# Patient Record
Sex: MALE | Race: OTHER | Employment: UNEMPLOYED | URBAN - METROPOLITAN AREA
[De-identification: names, ages, dates, MRNs, and addresses within clinical notes are randomized per-mention and may not be internally consistent; named-entity substitution may affect disease eponyms.]

---

## 2019-06-08 ENCOUNTER — APPOINTMENT (OUTPATIENT)
Dept: CT IMAGING | Age: 79
DRG: 292 | End: 2019-06-08
Payer: MEDICARE

## 2019-06-08 ENCOUNTER — APPOINTMENT (OUTPATIENT)
Dept: GENERAL RADIOLOGY | Age: 79
DRG: 292 | End: 2019-06-08
Payer: MEDICARE

## 2019-06-08 ENCOUNTER — HOSPITAL ENCOUNTER (INPATIENT)
Age: 79
LOS: 4 days | Discharge: HOME | DRG: 292 | End: 2019-06-12
Attending: EMERGENCY MEDICINE | Admitting: INTERNAL MEDICINE
Payer: MEDICARE

## 2019-06-08 DIAGNOSIS — I50.23 ACUTE ON CHRONIC SYSTOLIC HEART FAILURE (HCC): ICD-10-CM

## 2019-06-08 DIAGNOSIS — N50.89 SCROTAL EDEMA: ICD-10-CM

## 2019-06-08 DIAGNOSIS — I50.9 ACUTE CONGESTIVE HEART FAILURE, UNSPECIFIED HEART FAILURE TYPE (HCC): Primary | ICD-10-CM

## 2019-06-08 PROBLEM — I50.43 CHF (CONGESTIVE HEART FAILURE), NYHA CLASS I, ACUTE ON CHRONIC, COMBINED (HCC): Status: ACTIVE | Noted: 2019-06-08

## 2019-06-08 LAB
A/G RATIO: 1.2 (ref 1.1–2.2)
ALBUMIN SERPL-MCNC: 4.1 G/DL (ref 3.4–5)
ALP BLD-CCNC: 98 U/L (ref 40–129)
ALT SERPL-CCNC: 17 U/L (ref 10–40)
ANION GAP SERPL CALCULATED.3IONS-SCNC: 11 MMOL/L (ref 3–16)
APTT: 41.4 SEC (ref 26–36)
AST SERPL-CCNC: 24 U/L (ref 15–37)
BASOPHILS ABSOLUTE: 0 K/UL (ref 0–0.2)
BASOPHILS RELATIVE PERCENT: 0.6 %
BILIRUB SERPL-MCNC: 1.2 MG/DL (ref 0–1)
BILIRUBIN URINE: NEGATIVE
BLOOD, URINE: ABNORMAL
BUN BLDV-MCNC: 15 MG/DL (ref 7–20)
CALCIUM SERPL-MCNC: 9.7 MG/DL (ref 8.3–10.6)
CHLORIDE BLD-SCNC: 104 MMOL/L (ref 99–110)
CLARITY: CLEAR
CO2: 24 MMOL/L (ref 21–32)
COLOR: YELLOW
CREAT SERPL-MCNC: 0.9 MG/DL (ref 0.8–1.3)
EKG ATRIAL RATE: 258 BPM
EKG DIAGNOSIS: NORMAL
EKG Q-T INTERVAL: 482 MS
EKG QRS DURATION: 176 MS
EKG QTC CALCULATION (BAZETT): 516 MS
EKG R AXIS: -77 DEGREES
EKG T AXIS: 105 DEGREES
EKG VENTRICULAR RATE: 69 BPM
EOSINOPHILS ABSOLUTE: 0 K/UL (ref 0–0.6)
EOSINOPHILS RELATIVE PERCENT: 1 %
EPITHELIAL CELLS, UA: 0 /HPF (ref 0–5)
GFR AFRICAN AMERICAN: >60
GFR NON-AFRICAN AMERICAN: >60
GLOBULIN: 3.4 G/DL
GLUCOSE BLD-MCNC: 117 MG/DL (ref 70–99)
GLUCOSE BLD-MCNC: 168 MG/DL (ref 70–99)
GLUCOSE BLD-MCNC: 92 MG/DL (ref 70–99)
GLUCOSE URINE: NEGATIVE MG/DL
HCT VFR BLD CALC: 38.5 % (ref 40.5–52.5)
HEMOGLOBIN: 12.5 G/DL (ref 13.5–17.5)
HYALINE CASTS: 0 /LPF (ref 0–8)
INR BLD: 2.06 (ref 0.86–1.14)
KETONES, URINE: NEGATIVE MG/DL
LEUKOCYTE ESTERASE, URINE: NEGATIVE
LYMPHOCYTES ABSOLUTE: 0.7 K/UL (ref 1–5.1)
LYMPHOCYTES RELATIVE PERCENT: 21.3 %
MCH RBC QN AUTO: 29 PG (ref 26–34)
MCHC RBC AUTO-ENTMCNC: 32.5 G/DL (ref 31–36)
MCV RBC AUTO: 89.2 FL (ref 80–100)
MICROSCOPIC EXAMINATION: YES
MONOCYTES ABSOLUTE: 1.4 K/UL (ref 0–1.3)
MONOCYTES RELATIVE PERCENT: 39 %
NEUTROPHILS ABSOLUTE: 1.3 K/UL (ref 1.7–7.7)
NEUTROPHILS RELATIVE PERCENT: 38.1 %
NITRITE, URINE: NEGATIVE
PDW BLD-RTO: 15.4 % (ref 12.4–15.4)
PERFORMED ON: ABNORMAL
PERFORMED ON: NORMAL
PH UA: 7.5 (ref 5–8)
PLATELET # BLD: 94 K/UL (ref 135–450)
PLATELET SLIDE REVIEW: ABNORMAL
PMV BLD AUTO: 7.9 FL (ref 5–10.5)
POTASSIUM REFLEX MAGNESIUM: 4.5 MMOL/L (ref 3.5–5.1)
PRO-BNP: 3336 PG/ML (ref 0–449)
PRO-BNP: 4372 PG/ML (ref 0–449)
PROTEIN UA: ABNORMAL MG/DL
PROTHROMBIN TIME: 23.5 SEC (ref 9.8–13)
RBC # BLD: 4.32 M/UL (ref 4.2–5.9)
RBC UA: 9 /HPF (ref 0–4)
SODIUM BLD-SCNC: 139 MMOL/L (ref 136–145)
SPECIFIC GRAVITY UA: 1.01 (ref 1–1.03)
TOTAL PROTEIN: 7.5 G/DL (ref 6.4–8.2)
TROPONIN: <0.01 NG/ML
URINE REFLEX TO CULTURE: ABNORMAL
URINE TYPE: ABNORMAL
UROBILINOGEN, URINE: 1 E.U./DL
WBC # BLD: 3.5 K/UL (ref 4–11)
WBC UA: 0 /HPF (ref 0–5)

## 2019-06-08 PROCEDURE — 6360000002 HC RX W HCPCS: Performed by: INTERNAL MEDICINE

## 2019-06-08 PROCEDURE — 6360000002 HC RX W HCPCS: Performed by: EMERGENCY MEDICINE

## 2019-06-08 PROCEDURE — 85025 COMPLETE CBC W/AUTO DIFF WBC: CPT

## 2019-06-08 PROCEDURE — 36415 COLL VENOUS BLD VENIPUNCTURE: CPT

## 2019-06-08 PROCEDURE — 2580000003 HC RX 258: Performed by: INTERNAL MEDICINE

## 2019-06-08 PROCEDURE — 83880 ASSAY OF NATRIURETIC PEPTIDE: CPT

## 2019-06-08 PROCEDURE — 6370000000 HC RX 637 (ALT 250 FOR IP): Performed by: INTERNAL MEDICINE

## 2019-06-08 PROCEDURE — 93005 ELECTROCARDIOGRAM TRACING: CPT | Performed by: EMERGENCY MEDICINE

## 2019-06-08 PROCEDURE — 2060000000 HC ICU INTERMEDIATE R&B

## 2019-06-08 PROCEDURE — 80053 COMPREHEN METABOLIC PANEL: CPT

## 2019-06-08 PROCEDURE — 81001 URINALYSIS AUTO W/SCOPE: CPT

## 2019-06-08 PROCEDURE — 71045 X-RAY EXAM CHEST 1 VIEW: CPT

## 2019-06-08 PROCEDURE — 85610 PROTHROMBIN TIME: CPT

## 2019-06-08 PROCEDURE — 93010 ELECTROCARDIOGRAM REPORT: CPT | Performed by: INTERNAL MEDICINE

## 2019-06-08 PROCEDURE — 96374 THER/PROPH/DIAG INJ IV PUSH: CPT

## 2019-06-08 PROCEDURE — 99285 EMERGENCY DEPT VISIT HI MDM: CPT

## 2019-06-08 PROCEDURE — 74176 CT ABD & PELVIS W/O CONTRAST: CPT

## 2019-06-08 PROCEDURE — 83036 HEMOGLOBIN GLYCOSYLATED A1C: CPT

## 2019-06-08 PROCEDURE — 84484 ASSAY OF TROPONIN QUANT: CPT

## 2019-06-08 PROCEDURE — 85730 THROMBOPLASTIN TIME PARTIAL: CPT

## 2019-06-08 RX ORDER — FUROSEMIDE 10 MG/ML
20 INJECTION INTRAMUSCULAR; INTRAVENOUS 4 TIMES DAILY
Status: DISCONTINUED | OUTPATIENT
Start: 2019-06-08 | End: 2019-06-10

## 2019-06-08 RX ORDER — SODIUM CHLORIDE 0.9 % (FLUSH) 0.9 %
10 SYRINGE (ML) INJECTION EVERY 12 HOURS SCHEDULED
Status: DISCONTINUED | OUTPATIENT
Start: 2019-06-08 | End: 2019-06-12 | Stop reason: HOSPADM

## 2019-06-08 RX ORDER — LISINOPRIL 30 MG/1
30 TABLET ORAL DAILY
Status: ON HOLD | COMMUNITY
End: 2019-06-12 | Stop reason: HOSPADM

## 2019-06-08 RX ORDER — ONDANSETRON 2 MG/ML
4 INJECTION INTRAMUSCULAR; INTRAVENOUS EVERY 6 HOURS PRN
Status: DISCONTINUED | OUTPATIENT
Start: 2019-06-08 | End: 2019-06-12 | Stop reason: HOSPADM

## 2019-06-08 RX ORDER — DEXTROSE MONOHYDRATE 25 G/50ML
12.5 INJECTION, SOLUTION INTRAVENOUS PRN
Status: DISCONTINUED | OUTPATIENT
Start: 2019-06-08 | End: 2019-06-12 | Stop reason: HOSPADM

## 2019-06-08 RX ORDER — DEXTROSE MONOHYDRATE 50 MG/ML
100 INJECTION, SOLUTION INTRAVENOUS PRN
Status: DISCONTINUED | OUTPATIENT
Start: 2019-06-08 | End: 2019-06-12 | Stop reason: HOSPADM

## 2019-06-08 RX ORDER — SIMVASTATIN 40 MG
40 TABLET ORAL NIGHTLY
COMMUNITY

## 2019-06-08 RX ORDER — NICOTINE POLACRILEX 4 MG
15 LOZENGE BUCCAL PRN
Status: DISCONTINUED | OUTPATIENT
Start: 2019-06-08 | End: 2019-06-12 | Stop reason: HOSPADM

## 2019-06-08 RX ORDER — SODIUM CHLORIDE 0.9 % (FLUSH) 0.9 %
10 SYRINGE (ML) INJECTION PRN
Status: DISCONTINUED | OUTPATIENT
Start: 2019-06-08 | End: 2019-06-12 | Stop reason: HOSPADM

## 2019-06-08 RX ORDER — SIMVASTATIN 10 MG
20 TABLET ORAL NIGHTLY
Status: DISCONTINUED | OUTPATIENT
Start: 2019-06-08 | End: 2019-06-12 | Stop reason: HOSPADM

## 2019-06-08 RX ORDER — GLIMEPIRIDE 2 MG/1
2 TABLET ORAL
COMMUNITY

## 2019-06-08 RX ORDER — FUROSEMIDE 10 MG/ML
40 INJECTION INTRAMUSCULAR; INTRAVENOUS ONCE
Status: COMPLETED | OUTPATIENT
Start: 2019-06-08 | End: 2019-06-08

## 2019-06-08 RX ADMIN — FUROSEMIDE 40 MG: 10 INJECTION, SOLUTION INTRAMUSCULAR; INTRAVENOUS at 14:20

## 2019-06-08 RX ADMIN — RIVAROXABAN 20 MG: 20 TABLET, FILM COATED ORAL at 18:34

## 2019-06-08 RX ADMIN — FUROSEMIDE 20 MG: 10 INJECTION, SOLUTION INTRAMUSCULAR; INTRAVENOUS at 20:38

## 2019-06-08 RX ADMIN — SIMVASTATIN 20 MG: 10 TABLET, FILM COATED ORAL at 20:38

## 2019-06-08 RX ADMIN — Medication 10 ML: at 20:38

## 2019-06-08 RX ADMIN — METOPROLOL TARTRATE 25 MG: 25 TABLET ORAL at 20:38

## 2019-06-08 SDOH — HEALTH STABILITY: MENTAL HEALTH: HOW OFTEN DO YOU HAVE A DRINK CONTAINING ALCOHOL?: NEVER

## 2019-06-08 ASSESSMENT — PAIN SCALES - GENERAL
PAINLEVEL_OUTOF10: 0
PAINLEVEL_OUTOF10: 0

## 2019-06-08 NOTE — H&P
Hospital Medicine History & Physical      PCP: ALLY Quinteros MD    Date of Admission: 6/8/2019    Date of Service: Pt seen/examined on 6.8.19 . Patient will be admitted  in/to the hospital.    Chief Complaint:  Sob, scrotzl swelling x 1 week       History Of Present Illness:      [de-identified] y.o. male who presented to [facility] ed  with above complaints. Symptom onset has been acute for a time period of 1 week(s). Severity is described as mild-moderate. Course of his symptoms over time is constant. Associated with swellign and wt gain   Not Associated with fever, chils   No h/o of recent travel, ill contacts,weight loss. Pain description -  No pain     Past Medical History:          Diagnosis Date    AV heart block     CAD (coronary artery disease)     had blockages that needed bypass    [facility]     Diabetes mellitus ([facility] [address]     Hyperlipidemia     Hypertension        Past Surgical History:          Procedure Laterality Date    CORONARY ARTERY BYPASS GRAFT      EYE SURGERY      PACEMAKER PLACEMENT         Medications Prior to Admission:      Prior to Admission medications    Medication Sig Start Date End Date Taking?  Authorizing Provider   metoprolol succinate (TOPROL XL) 50 MG extended release tablet Take 1 tablet by mouth daily 6/13/19  Yes Rashid Wharton MD   lisinopril (PRINIVIL;ZESTRIL) 5 MG tablet Take 1 tablet by mouth daily 6/13/19  Yes Sofya Green MD   furosemide (LASIX) 40 MG tablet Take 1 tablet by mouth daily 6/13/19  Yes Sofya Green MD   aspirin 81 MG chewable tablet Take 1 tablet by mouth daily 6/13/19  Yes Rashid Wharton MD   vitamin C (ASCORBIC ACID) 500 MG tablet Take 500 mg by mouth daily   Yes Historical Provider, MD   Multiple Vitamins-Minerals (THERAPEUTIC MULTIVITAMIN-MINERALS) tablet Take 1 tablet by mouth daily   Yes Historical Provider, MD   rivaroxaban (XARELTO) 20 MG TABS tablet Take 20 mg by mouth Daily with supper   Yes Historical Provider, MD   glimepiride (AMARYL) 2 MG tablet Take 2 mg by mouth every morning (before breakfast)   Yes Historical Provider, MD   simvastatin (ZOCOR) 40 MG tablet Take 40 mg by mouth nightly   Yes Historical Provider, MD       Allergies:  Patient has no known allergies. Social History:      The patient currently lives at home with son       TOBACCO:   reports that he has never smoked. He has never used smokeless tobacco.  ETOH:   reports that he does not drink alcohol. Family History:      Reviewed in detail and d/w patient. Family History   Problem Relation Age of Onset    No Known Problems Mother     No Known Problems Father             PHYSICAL EXAM PERFORMED BY ME:    /71   Pulse 68   Temp 97.9 °F (36.6 °C) (Oral)   Resp 18   Ht 5' 4\" (1.626 m)   Wt 159 lb 6.3 oz (72.3 kg)   SpO2 93%   BMI 27.36 kg/m²     General appearance: comfortable, distress- none   HEENT: Atraumatic, Pupils equal, round, and reactive to light. Extra ocular muscles intact. Neck: Supple, with full range of motion. No jugular venous distention. Respiratory:  Crackles on  auscultation , accessory muscle use no, Rales/Ronchi n  Cardiovascular: Regular rate and rhythm with normal S1/S2 ,  Abdomen: Soft, non-tender, non-distended with normal bowel sounds. Musculoskelatal: No clubbing, 1+  edema bilaterally. Dorsalis pedal pulses +   And capillary refills time is  <  than 3 secs. Skin: Skin color, texture, turgor normal.     Neurologic:   Neurovascularly intact grossly non-focal.      CXR:  I have reviewed the CXR with the following interpretation: pul edema   EKG:  I have reviewed the EKG with the following interpretation: vpaced     Labs:     No results for input(s): WBC, HGB, HCT, PLT in the last 72 hours. No results for input(s): NA, K, CL, CO2, BUN, CREATININE, CALCIUM, PHOS in the last 72 hours.     Invalid input(s): MAGNES  No results for input(s): AST, ALT, BILIDIR, BILITOT, ALKPHOS in the last 72 hours. No results for input(s): INR in the last 72 hours. No results for input(s): Sonal Lacrosse in the last 72 hours. No flowsheet data found. Urinalysis:      Lab Results   Component Value Date    NITRU Negative 06/08/2019    WBCUA 0 06/08/2019    RBCUA 9 06/08/2019    BLOODU MODERATE 06/08/2019    SPECGRAV 1.013 06/08/2019    GLUCOSEU Negative 06/08/2019       Reviewed his/her  old charts from [address] [place] [address], dated 2019 , showed outpatient visit with Dr. Skip Bacon for reg visit       Diagnostic Assesment and Plan :   1. Admitted for acute on chronic diastolic CHF, swelling in legs and scrotum, requiring IV Lasix. History of chronic atrial fibrillation on anticoagulation with Xarelto, status post permanent pacemaker, EKG is V paced. BNP is more than 3000 no evidence of cellulitis on examination  2. Diabetes mellitus type 2 on oral hypoglycemic agents, now on hold, monitors glucoses on sliding scale. 3.  Obesity he needed to lose some weight. 4.  CAD, remote history of CABG, no active chest pain at this time, will monitor troponins  Body mass index is 27.36 kg/m². The following items were considered in medical decision making:  Independent review of images, Review / order clinical lab tests, Review / order radiology, tests Decision to obtain old records. DVT Prophylaxis: xarelto   Diet: No diet orders on file  Code Status: Prior    PT/OT Eval Status: na    : na     Dispo - will monitor in floor   Needs to stay in hospital for iv lasix . I have discussed the above stated plan with the patient,  and they verbalized understanding and agreed with the plan. A total time of 15 min were exclusively devoted to discuss plan of care, and advanced care planning during this encounter. RN notified about todays plan  bed side. Sunny Dawson, [address]  This chart was generated in part by using Dragon Dictation system and may contain errors related to that system including errors in grammar, punctuation, and spelling, as well as words and phrases that may be inappropriate. When dictating, effort is made to correct spelling/grammar errors. If there are any questions or concerns please feel free to contact the dictating provider for clarification. Thank you ALLY Phelan MD for the opportunity to be involved in this patient's care. If you have any questions or concerns please feel free to contact me at [phone].

## 2019-06-08 NOTE — ED PROVIDER NOTES
[facility]  eMERGENCY dEPARTMENT eNCOUnter      Pt Name: Bon Talamantes  MRN: [id number]  Alexisgfyuan 1940  Date of evaluation: 6/8/2019  Provider: Darlyn Abreu, [address] [place] [address]       Chief Complaint   Patient presents with    Other     swollen scrotum with tenderness         HISTORY OF PRESENT ILLNESS   (Location/Symptom, Timing/Onset, Context/Setting, Quality, Duration, Modifying Factors, Severity)  Note limiting factors. Bon Talamantes is a [de-identified] y.o. male who presents to the emergency department with a complaint of scrotal edema that has been present for the last week. He denies any fever or chills. No prior occurrence. No history of hernia. He denies any trauma or injury. He reports that urine output has been slightly decreased from baseline but he is able to urinate without difficulty. Last bowel movement was earlier today and was normal. He denies any scrotal pain but states that it is a little bit tender from being so swollen. He does have lower extremity edema as well which seems to be slightly increased from baseline. He denies any prior history of congestive heart failure. He denies any use of diuretics. He is anticoagulated on Xarelto \"for his heart\" but denies any prior history of atrial fibrillation. He denies any prior history of kidney or liver disease. He denies any dysuria hematuria frequency or urgency. He denies any vomiting or diarrhea. He denies any abdominal pain back pain or flank pain. HPI    Nursing Notes were reviewed. REVIEW OF SYSTEMS    (2-9 systems for level 4, 10 or more for level 5)       Constitutional: Negative for fever or chills. HENT: Negative for rhinorrhea and sore throat. Eyes: Negative for redness or drainage. Respiratory: Negative for shortness of breath or dyspnea on exertion. Cardiovascular: Negative for chest pain. Gastrointestinal: Negative for abdominal pain. Negative for vomiting or diarrhea. Genitourinary: Negative for flank pain. Negative for dysuria. Negative for hematuria. Neurological: Negative for headache. Hematological: Negative for adenopathy. Psychiatric/Behavioral: Negative for confusion. All systems are reviewed and are negative except for those listed above in the history of present illness and ROS. PAST MEDICAL HISTORY     Past Medical History:   Diagnosis Date    CAD (coronary artery disease)     had blockages that needed bypass    Diabetes mellitus ([facility] [address]     Hypertension          SURGICAL HISTORY       Past Surgical History:   Procedure Laterality Date    CARDIAC SURGERY      pt had bypass 15 years ago         CURRENT MEDICATIONS       Previous Medications    No medications on file       ALLERGIES     Patient has no known allergies. FAMILY HISTORY     History reviewed. No pertinent family history. SOCIAL HISTORY       Social History     Socioeconomic History    Marital status:       Spouse name: None    Number of children: None    Years of education: None    Highest education level: None   Occupational History    None   Social Needs    Financial resource strain: None    Food insecurity:     Worry: None     Inability: None    Transportation needs:     Medical: None     Non-medical: None   Tobacco Use    Smoking status: Never Smoker    Smokeless tobacco: Never Used   Substance and Sexual Activity    Alcohol use: Never     Frequency: Never    Drug use: Never    Sexual activity: None   Lifestyle    Physical activity:     Days per week: None     Minutes per session: None    Stress: None   Relationships    Social connections:     Talks on phone: None     Gets together: None     Attends Spiritism service: None     Active member of club or organization: None     Attends meetings of clubs or organizations: None     Relationship status: None    Intimate partner violence:     Fear of current or ex partner: None     Emotionally abused: None Physically abused: None     Forced sexual activity: None   Other Topics Concern    None   Social History Narrative    None       SCREENINGS             PHYSICAL EXAM    (up to 7 for level 4, 8 or more for level 5)     ED Triage Vitals [06/08/19 1249]   BP Temp Temp Source Pulse Resp SpO2 Height Weight   134/72 97.6 °F (36.4 °C) Oral 69 14 98 % 5' 4\" (1.626 m) 179 lb 3.7 oz (81.3 kg)       Physical Exam   Constitutional: Awake and alert and oriented to person place and time. No apparent distress. Head: No visible evidence of trauma. Normocephalic. Eyes: Pupils equal and reactive. No photophobia. Conjunctiva normal.    HENT: Oral mucosa moist.  Airway patent. Neck:  Soft and supple. Heart:  Regular rate and rhythm. No murmur. Lungs:  Slight conversational dyspnea. No accessory muscle use. By basilar rales were noted. Chest: Chest wall non-tender. No evidence of trauma. Abdomen:  Soft, nondistended, bowel sounds present. Nontender. No guarding rigidity or rebound. No masses. There is edema to the lower abdominal wall. No definite hernia palpated. The penis was retracted into the scrotal edema. I was able to visualize the head of the penis and it appeared normal. Urethral meatus was normal. No drainage. No blood. Scrotal skin appeared to be stretched tightly due to the large amount of scrotal edema, however, there is no erythema. No visible necrosis. No crepitance. In the scrotum was grossly nontender. Testicles were nontender to palpation. Musculoskeletal: Extremities non-tender with full range of motion. There was 2+ bilateral lower extremity edema and capillary refill less than 2 seconds in all digits. No edema in the upper extremities. Neurological: Alert and oriented x 3. Speech clear. Cranial nerves II-XII intact. No facial droop. No acute focal motor or sensory deficits. Skin: Skin is warm and dry. No rash. Lymphatic:  No lympadenopathy. Psychiatric: Normal mood and affect.  Behavior nontender, there is only some mild vague discomfort due to the stretching of the skin. No clinical suspicion for testicular torsion or orchitis. He does have significant bilateral lower extremity edema as well as edema extending onto the lower abdominal wall consistent with anasarca. CT abdomen and pelvis without contrast was ordered to assess the scrotum, evidence of ascites, and abdominal wall edema, and rule out hernia. MDM      REASSESSMENT          Laboratory studies were reviewed. White blood cell count is normal. I suspect that the scrotal edema is secondary to volume overload. He does have evidence of acute CHF with some bilateral pleural effusions and pulmonary edema. He was given Lasix 40 mg IV. He will be admitted for further treatment and evaluation. Renal function was normal. Liver function was normal. Albumin was normal.  BNP was 3336. I suspect that his scrotal edema and anasarca will improve with diuresis. A call was placed to the hospitalist on-call for admission. CRITICAL CARE TIME   Total Critical Care time was 0 minutes, excluding separately reportable procedures. There was a high probability of clinically significant/life threatening deterioration in the patient's condition which required my urgent intervention. CONSULTS:  None    PROCEDURES:  Unless otherwise noted below, none     Procedures        FINAL IMPRESSION      1. Acute congestive heart failure, unspecified heart failure type ([address]    2. Scrotal edema          DISPOSITION/PLAN   DISPOSITION        PATIENT REFERRED TO:  No follow-up provider specified. DISCHARGE MEDICATIONS:  New Prescriptions    No medications on file     Controlled Substances Monitoring:     No flowsheet data found. (Please note that portions of this note were completed with a voice recognition program.  Efforts were made to edit the dictations but occasionally words are mis-transcribed. )    1859 Todd Garcia DO (electronically signed)  Attending Emergency Physician          Grazyna Gregorio DO  06/08/19 5600

## 2019-06-09 LAB
ANION GAP SERPL CALCULATED.3IONS-SCNC: 13 MMOL/L (ref 3–16)
BUN BLDV-MCNC: 17 MG/DL (ref 7–20)
CALCIUM SERPL-MCNC: 10 MG/DL (ref 8.3–10.6)
CHLORIDE BLD-SCNC: 101 MMOL/L (ref 99–110)
CHOLESTEROL, TOTAL: 94 MG/DL (ref 0–199)
CO2: 26 MMOL/L (ref 21–32)
CREAT SERPL-MCNC: 1 MG/DL (ref 0.8–1.3)
ESTIMATED AVERAGE GLUCOSE: 180 MG/DL
GFR AFRICAN AMERICAN: >60
GFR NON-AFRICAN AMERICAN: >60
GLUCOSE BLD-MCNC: 126 MG/DL (ref 70–99)
GLUCOSE BLD-MCNC: 152 MG/DL (ref 70–99)
GLUCOSE BLD-MCNC: 152 MG/DL (ref 70–99)
GLUCOSE BLD-MCNC: 71 MG/DL (ref 70–99)
GLUCOSE BLD-MCNC: 79 MG/DL (ref 70–99)
HBA1C MFR BLD: 7.9 %
HDLC SERPL-MCNC: 57 MG/DL (ref 40–60)
LDL CHOLESTEROL CALCULATED: 27 MG/DL
MAGNESIUM: 1.9 MG/DL (ref 1.8–2.4)
PERFORMED ON: ABNORMAL
PERFORMED ON: NORMAL
POTASSIUM SERPL-SCNC: 3.8 MMOL/L (ref 3.5–5.1)
SODIUM BLD-SCNC: 140 MMOL/L (ref 136–145)
TRIGL SERPL-MCNC: 49 MG/DL (ref 0–150)
TROPONIN: <0.01 NG/ML
VLDLC SERPL CALC-MCNC: 10 MG/DL

## 2019-06-09 PROCEDURE — 6370000000 HC RX 637 (ALT 250 FOR IP): Performed by: INTERNAL MEDICINE

## 2019-06-09 PROCEDURE — 83735 ASSAY OF MAGNESIUM: CPT

## 2019-06-09 PROCEDURE — 2580000003 HC RX 258: Performed by: INTERNAL MEDICINE

## 2019-06-09 PROCEDURE — 94760 N-INVAS EAR/PLS OXIMETRY 1: CPT

## 2019-06-09 PROCEDURE — 6360000002 HC RX W HCPCS: Performed by: INTERNAL MEDICINE

## 2019-06-09 PROCEDURE — 80048 BASIC METABOLIC PNL TOTAL CA: CPT

## 2019-06-09 PROCEDURE — 2060000000 HC ICU INTERMEDIATE R&B

## 2019-06-09 PROCEDURE — 80061 LIPID PANEL: CPT

## 2019-06-09 PROCEDURE — 36415 COLL VENOUS BLD VENIPUNCTURE: CPT

## 2019-06-09 PROCEDURE — 84484 ASSAY OF TROPONIN QUANT: CPT

## 2019-06-09 RX ORDER — M-VIT,TX,IRON,MINS/CALC/FOLIC 27MG-0.4MG
1 TABLET ORAL DAILY
COMMUNITY

## 2019-06-09 RX ORDER — ASCORBIC ACID 500 MG
500 TABLET ORAL DAILY
COMMUNITY

## 2019-06-09 RX ADMIN — FUROSEMIDE 20 MG: 10 INJECTION, SOLUTION INTRAMUSCULAR; INTRAVENOUS at 18:55

## 2019-06-09 RX ADMIN — FUROSEMIDE 20 MG: 10 INJECTION, SOLUTION INTRAMUSCULAR; INTRAVENOUS at 20:29

## 2019-06-09 RX ADMIN — Medication 10 ML: at 20:30

## 2019-06-09 RX ADMIN — INSULIN LISPRO 1 UNITS: 100 INJECTION, SOLUTION INTRAVENOUS; SUBCUTANEOUS at 21:55

## 2019-06-09 RX ADMIN — SIMVASTATIN 20 MG: 10 TABLET, FILM COATED ORAL at 20:29

## 2019-06-09 RX ADMIN — FUROSEMIDE 20 MG: 10 INJECTION, SOLUTION INTRAMUSCULAR; INTRAVENOUS at 09:04

## 2019-06-09 RX ADMIN — FUROSEMIDE 20 MG: 10 INJECTION, SOLUTION INTRAMUSCULAR; INTRAVENOUS at 12:28

## 2019-06-09 RX ADMIN — RIVAROXABAN 20 MG: 20 TABLET, FILM COATED ORAL at 18:56

## 2019-06-09 RX ADMIN — Medication 10 ML: at 09:04

## 2019-06-09 RX ADMIN — METOPROLOL TARTRATE 25 MG: 25 TABLET ORAL at 20:29

## 2019-06-09 RX ADMIN — METOPROLOL TARTRATE 25 MG: 25 TABLET ORAL at 09:04

## 2019-06-09 ASSESSMENT — PAIN SCALES - GENERAL: PAINLEVEL_OUTOF10: 0

## 2019-06-09 NOTE — PROGRESS NOTES
Adrienne Crowell MD  6/9/2019,   ALLY Baum MD    1940  Chief Complaint   Patient presents with    Other     swollen scrotum with tenderness        Active Problems:    CHF (congestive heart failure), NYHA class I, acute on chronic, combined ([address]  Resolved Problems:    * No resolved hospital problems. *     has a past medical history of CAD (coronary artery disease), Diabetes mellitus ([address] Hyperlipidemia, and Hypertension. Past Surgical History:     has a past surgical history that includes Cardiac surgery and eye surgery. ED REVIEW:  Chief Complaint   Patient presents with    Other       swollen scrotum with tenderness            HISTORY OF PRESENT ILLNESS   (Location/Symptom, Timing/Onset, Context/Setting, Quality, Duration, Modifying Factors, Severity)  Note limiting factors. Clay Bustillo is a [de-identified] y.o. male who presents to the emergency department with a complaint of scrotal edema that has been present for the last week. He denies any fever or chills. No prior occurrence. No history of hernia. He denies any trauma or injury. He reports that urine output has been slightly decreased from baseline but he is able to urinate without difficulty. Last bowel movement was earlier today and was normal. He denies any scrotal pain but states that it is a little bit tender from being so swollen. He does have lower extremity edema as well which seems to be slightly increased from baseline. CURRENT STATUS:Diagnostic Assesment and Plan :   1. Admitted for acute on chronic diastolic CHF, swelling in legs and scrotum, requiring IV Lasix. History of chronic atrial fibrillation on anticoagulation with Xarelto, status post permanent pacemaker, EKG is V paced. BNP is more than 3000 no evidence of cellulitis on examination  2. Diabetes mellitus type 2 on oral hypoglycemic agents, now on hold, monitors glucoses on sliding scale. 3.  Obesity he needed to lose some weight.   4.  CAD, remote history of CABG, no active chest pain at this time, will monitor troponins  Body mass index is 30.77 kg/m².        The following items were considered in medical decision making:  Independent review of images, Review / order clinical lab tests, Review   DR Best      IMAGING-Relevant:  Anasarca noted in the lower extremities and lower abdominal wall.  Severe  scrotal wall edema and likely bilateral hydroceles.  No soft tissue gas to  suggest gangrene. Peritoneum/Retroperitoneum: No evidence of AAA or lymphadenopathy.  Trace  ascites.  Small fat containing inguinal hernias bilaterally. Bones/Soft Tissues: No suspicious osseous lesions.      Impression:       Severe scrotal soft tissue swelling suspicious for cellulitis.  No soft  tissue gas to suggest gangrene. Cardiomegaly and bilateral pleural effusions, possibly representing mild  pulmonary edema. Cholelithiasis. MY REVIEW:  [de-identified] male  scrotal issue ? Cellulitis or  anasarca? Hematuria  Arrythmia Atrial fibrillation  CHF pulmonary edema  Hyperglycemia 168  152  Hyperbilirubinemia 1.2  Anticoagulation inr 2.06  Hyperlipidemia on RX  Cholelithiasis      Workup continues  The Utilization Review Committee members, including its physician members, have reviewed this case and agree that this patient does meet evidence based criteria for inpatient services,  to ADMISSION =\"admit as inpatient\"  Medical care will continue to be provided by Chele Murillo MD, who concurs with this decision and has documented his/her concurrence in the patient's medical record. Pritesh Garcia MD, MISSY, [place] [address], [address] [place] [address]  Cardiac, Vascular & Thoracic Surgeon  [address] [place] [id number]    Office [phone]   [phone]  [email or web address]    6/9/2019  3:05 PM

## 2019-06-09 NOTE — PROGRESS NOTES
Pt actually in bed with his eyes closed. Has slept very little. Was not able to get comfortable. Had stated earlier that he takes little naps.

## 2019-06-09 NOTE — PROGRESS NOTES
Progress Note    Admit Date: 6/8/2019         Subjective and Overnight Events: Pt being followed up for scrotal edema and SOB  SOB resolved   No sob  No chest pain   Scrotal edema better  No pain  No erythema no calor       Objective:   Vitals: /84   Pulse 81   Temp 97.3 °F (36.3 °C) (Oral)   Resp 18   Ht 5' 4\" (1.626 m)   Wt 172 lb 6.4 oz (78.2 kg)   SpO2 98%   BMI 29.59 kg/m²   /84   Pulse 81   Temp 97.3 °F (36.3 °C) (Oral)   Resp 18   Ht 5' 4\" (1.626 m)   Wt 172 lb 6.4 oz (78.2 kg)   SpO2 98%   BMI 29.59 kg/m²     General Appearance:    Alert, cooperative, no distress, appears stated age   Head:    Normocephalic, without obvious abnormality, atraumatic   Eyes:    PERRL, conjunctiva/corneas clear       Ears:    Normal TM's and external ear canals, both ears   Nose:   Nares normal, septum midline, mucosa normal   Throat:   Lips, mucosa, and tongue normal; teeth and gums normal           Lungs:     Clear to auscultation bilaterally, respirations unlabored       Heart:    Regular rate and rhythm, S1 and S2 normal, no murmur, rub   or gallop   Abdomen:     Soft, non-tender, bowel sounds active all four quadrants,     no masses, no organomegaly           Extremities: Scrotal edema better  No pain  No erythema no calor    Pulses:    Skin:        Neurologic:   CNII-XII intact.  Normal strength, sensation and reflexes       throughout         Pain is:None  Nausea:None  Bowel Movement/Flatus yes    Data:     Scheduled Medications:    sodium chloride flush  10 mL Intravenous 2 times per day    furosemide  20 mg Intravenous 4x Daily    rivaroxaban  20 mg Oral Daily    insulin lispro  0-6 Units Subcutaneous TID WC    insulin lispro  0-3 Units Subcutaneous Nightly    simvastatin  20 mg Oral Nightly    metoprolol tartrate  25 mg Oral BID      PRN Medications: sodium chloride flush, magnesium hydroxide, ondansetron, glucose, dextrose, glucagon

## 2019-06-09 NOTE — PLAN OF CARE
Problem: SKIN INTEGRITY  Goal: Skin integrity is maintained or improved  Outcome: Ongoing     Problem: OXYGENATION/RESPIRATORY FUNCTION  Goal: Patient will maintain patent airway  Outcome: Ongoing     Problem: OXYGENATION/RESPIRATORY FUNCTION  Goal: Patient will achieve/maintain normal respiratory rate/effort  Description  Respiratory rate and effort will be within normal limits for the patient  Outcome: Ongoing     Problem: HEMODYNAMIC STATUS  Goal: Patient has stable vital signs and fluid balance  Outcome: Ongoing     Problem: FLUID AND ELECTROLYTE IMBALANCE  Goal: Fluid and electrolyte balance are achieved/maintained  Outcome: Completed   Assess breath sounds, oxygen requirements and saturations, and activity tolerance. Monitor intake and output and daily weights and lab values. Problem: SAFETY  Goal: Free from intentional harm  Outcome: Ongoing   Patient assessed for fall risk; fall precautions initiated. Patient and family instructed about safety devices. Environment kept free of clutter and adequate lighting provided. Bed locked and in lowest position. Call light within reach.

## 2019-06-09 NOTE — PLAN OF CARE
Fall risk precautions in place. Bed in lowest position with wheels locked,bed alarm in place and activated,non-skid socks on pt, fall risk ID on pt, call light in reach, will continue to monitor. Skin assessment completed every shift. Pt assessed for incontinence, appropriate barrier cream applied prn. Pt encouraged to turn/rotate every 2 hours. Assistance provided if pt unable to do so themselves. Educated patient/family on CHF signs/ symptoms, causes, treatments, limited fluid intake, daily weights, discharge medications, low sodium diet, activiety and follow-up. Pt to call physician if weight gain of 3 pounds in one day or 5 pounds in one week.

## 2019-06-10 LAB
ANION GAP SERPL CALCULATED.3IONS-SCNC: 14 MMOL/L (ref 3–16)
BUN BLDV-MCNC: 26 MG/DL (ref 7–20)
CALCIUM SERPL-MCNC: 10.9 MG/DL (ref 8.3–10.6)
CHLORIDE BLD-SCNC: 97 MMOL/L (ref 99–110)
CO2: 29 MMOL/L (ref 21–32)
CREAT SERPL-MCNC: 0.8 MG/DL (ref 0.8–1.3)
GFR AFRICAN AMERICAN: >60
GFR NON-AFRICAN AMERICAN: >60
GLUCOSE BLD-MCNC: 123 MG/DL (ref 70–99)
GLUCOSE BLD-MCNC: 131 MG/DL (ref 70–99)
GLUCOSE BLD-MCNC: 158 MG/DL (ref 70–99)
GLUCOSE BLD-MCNC: 87 MG/DL (ref 70–99)
LV EF: 28 %
LVEF MODALITY: NORMAL
MAGNESIUM: 2 MG/DL (ref 1.8–2.4)
PERFORMED ON: ABNORMAL
PERFORMED ON: ABNORMAL
PERFORMED ON: NORMAL
POTASSIUM SERPL-SCNC: 4.4 MMOL/L (ref 3.5–5.1)
PRO-BNP: 3175 PG/ML (ref 0–449)
SODIUM BLD-SCNC: 140 MMOL/L (ref 136–145)

## 2019-06-10 PROCEDURE — 94760 N-INVAS EAR/PLS OXIMETRY 1: CPT

## 2019-06-10 PROCEDURE — 2060000000 HC ICU INTERMEDIATE R&B

## 2019-06-10 PROCEDURE — 6360000002 HC RX W HCPCS: Performed by: INTERNAL MEDICINE

## 2019-06-10 PROCEDURE — 6370000000 HC RX 637 (ALT 250 FOR IP): Performed by: INTERNAL MEDICINE

## 2019-06-10 PROCEDURE — C8929 TTE W OR WO FOL WCON,DOPPLER: HCPCS

## 2019-06-10 PROCEDURE — 6360000004 HC RX CONTRAST MEDICATION: Performed by: INTERNAL MEDICINE

## 2019-06-10 PROCEDURE — 36415 COLL VENOUS BLD VENIPUNCTURE: CPT

## 2019-06-10 PROCEDURE — 80048 BASIC METABOLIC PNL TOTAL CA: CPT

## 2019-06-10 PROCEDURE — 83735 ASSAY OF MAGNESIUM: CPT

## 2019-06-10 PROCEDURE — 83880 ASSAY OF NATRIURETIC PEPTIDE: CPT

## 2019-06-10 PROCEDURE — 2580000003 HC RX 258: Performed by: INTERNAL MEDICINE

## 2019-06-10 RX ORDER — FUROSEMIDE 10 MG/ML
40 INJECTION INTRAMUSCULAR; INTRAVENOUS 2 TIMES DAILY
Status: DISCONTINUED | OUTPATIENT
Start: 2019-06-10 | End: 2019-06-11

## 2019-06-10 RX ADMIN — Medication 10 ML: at 20:22

## 2019-06-10 RX ADMIN — INSULIN LISPRO 1 UNITS: 100 INJECTION, SOLUTION INTRAVENOUS; SUBCUTANEOUS at 21:49

## 2019-06-10 RX ADMIN — FUROSEMIDE 40 MG: 10 INJECTION, SOLUTION INTRAMUSCULAR; INTRAVENOUS at 17:44

## 2019-06-10 RX ADMIN — FUROSEMIDE 20 MG: 10 INJECTION, SOLUTION INTRAMUSCULAR; INTRAVENOUS at 09:20

## 2019-06-10 RX ADMIN — SIMVASTATIN 20 MG: 10 TABLET, FILM COATED ORAL at 20:25

## 2019-06-10 RX ADMIN — METOPROLOL TARTRATE 25 MG: 25 TABLET ORAL at 09:20

## 2019-06-10 RX ADMIN — PERFLUTREN 1.5 ML: 6.52 INJECTION, SUSPENSION INTRAVENOUS at 08:16

## 2019-06-10 RX ADMIN — RIVAROXABAN 20 MG: 20 TABLET, FILM COATED ORAL at 17:44

## 2019-06-10 ASSESSMENT — PAIN SCALES - GENERAL
PAINLEVEL_OUTOF10: 0
PAINLEVEL_OUTOF10: 0

## 2019-06-10 NOTE — PLAN OF CARE
Problem: SAFETY  Goal: Free from accidental physical injury  Outcome: Ongoing   Patient assessed for fall risk; fall precautions initiated. Patient and family instructed about safety devices. Environment kept free of clutter and adequate lighting provided. Bed locked and in lowest position. Call light within reach. Problem: OXYGENATION/RESPIRATORY FUNCTION  Goal: Patient will maintain patent airway  Outcome: Ongoing     Problem: OXYGENATION/RESPIRATORY FUNCTION  Goal: Patient will achieve/maintain normal respiratory rate/effort  Description  Respiratory rate and effort will be within normal limits for the patient  Outcome: Ongoing     Problem: HEMODYNAMIC STATUS  Goal: Patient has stable vital signs and fluid balance  Outcome: Ongoing   Assess breath sounds, oxygen requirements and saturations, and activity tolerance. Monitor intake and output and daily weights and lab values. Encourage fluid and dietary restrictions.

## 2019-06-10 NOTE — PROGRESS NOTES
Continues having scrotal swelling   Refused support with depends   Stated he is careful on getting up so that he is not applying pressure to area.

## 2019-06-11 PROBLEM — I50.23 ACUTE ON CHRONIC SYSTOLIC HEART FAILURE (HCC): Status: ACTIVE | Noted: 2019-06-08

## 2019-06-11 LAB
ANION GAP SERPL CALCULATED.3IONS-SCNC: 12 MMOL/L (ref 3–16)
BUN BLDV-MCNC: 33 MG/DL (ref 7–20)
CALCIUM SERPL-MCNC: 10.6 MG/DL (ref 8.3–10.6)
CHLORIDE BLD-SCNC: 98 MMOL/L (ref 99–110)
CO2: 29 MMOL/L (ref 21–32)
CREAT SERPL-MCNC: 1.5 MG/DL (ref 0.8–1.3)
GFR AFRICAN AMERICAN: 54
GFR NON-AFRICAN AMERICAN: 45
GLUCOSE BLD-MCNC: 134 MG/DL (ref 70–99)
GLUCOSE BLD-MCNC: 151 MG/DL (ref 70–99)
GLUCOSE BLD-MCNC: 152 MG/DL (ref 70–99)
GLUCOSE BLD-MCNC: 192 MG/DL (ref 70–99)
GLUCOSE BLD-MCNC: 272 MG/DL (ref 70–99)
MAGNESIUM: 2 MG/DL (ref 1.8–2.4)
PERFORMED ON: ABNORMAL
POTASSIUM SERPL-SCNC: 3.5 MMOL/L (ref 3.5–5.1)
SODIUM BLD-SCNC: 139 MMOL/L (ref 136–145)

## 2019-06-11 PROCEDURE — 2060000000 HC ICU INTERMEDIATE R&B

## 2019-06-11 PROCEDURE — 36415 COLL VENOUS BLD VENIPUNCTURE: CPT

## 2019-06-11 PROCEDURE — 6370000000 HC RX 637 (ALT 250 FOR IP): Performed by: INTERNAL MEDICINE

## 2019-06-11 PROCEDURE — 2580000003 HC RX 258: Performed by: INTERNAL MEDICINE

## 2019-06-11 PROCEDURE — 83735 ASSAY OF MAGNESIUM: CPT

## 2019-06-11 PROCEDURE — 94760 N-INVAS EAR/PLS OXIMETRY 1: CPT

## 2019-06-11 PROCEDURE — 99223 1ST HOSP IP/OBS HIGH 75: CPT | Performed by: INTERNAL MEDICINE

## 2019-06-11 PROCEDURE — 80048 BASIC METABOLIC PNL TOTAL CA: CPT

## 2019-06-11 RX ORDER — METOPROLOL SUCCINATE 50 MG/1
50 TABLET, EXTENDED RELEASE ORAL DAILY
Status: DISCONTINUED | OUTPATIENT
Start: 2019-06-12 | End: 2019-06-12 | Stop reason: HOSPADM

## 2019-06-11 RX ORDER — FUROSEMIDE 10 MG/ML
40 INJECTION INTRAMUSCULAR; INTRAVENOUS DAILY
Status: DISCONTINUED | OUTPATIENT
Start: 2019-06-12 | End: 2019-06-11

## 2019-06-11 RX ADMIN — METOPROLOL TARTRATE 25 MG: 25 TABLET ORAL at 21:53

## 2019-06-11 RX ADMIN — RIVAROXABAN 20 MG: 20 TABLET, FILM COATED ORAL at 17:47

## 2019-06-11 RX ADMIN — INSULIN LISPRO 3 UNITS: 100 INJECTION, SOLUTION INTRAVENOUS; SUBCUTANEOUS at 11:59

## 2019-06-11 RX ADMIN — INSULIN LISPRO 1 UNITS: 100 INJECTION, SOLUTION INTRAVENOUS; SUBCUTANEOUS at 21:53

## 2019-06-11 RX ADMIN — Medication 10 ML: at 21:54

## 2019-06-11 RX ADMIN — METOPROLOL TARTRATE 25 MG: 25 TABLET ORAL at 09:28

## 2019-06-11 RX ADMIN — SIMVASTATIN 20 MG: 10 TABLET, FILM COATED ORAL at 21:53

## 2019-06-11 RX ADMIN — Medication 10 ML: at 09:29

## 2019-06-11 RX ADMIN — INSULIN LISPRO 1 UNITS: 100 INJECTION, SOLUTION INTRAVENOUS; SUBCUTANEOUS at 17:46

## 2019-06-11 ASSESSMENT — PAIN SCALES - GENERAL
PAINLEVEL_OUTOF10: 0
PAINLEVEL_OUTOF10: 0

## 2019-06-11 NOTE — CONSULTS
Nutrition Assessment (Low Risk)    Type and Reason for Visit: Initial, Consult(CHF)    Nutrition Recommendations:   Attempt to follow up for further ed needs     Nutrition Assessment:  Patient assessed for nutritional risk. Deemed to be at low risk at this time. Will continue to monitor for changes in status. Malnutrition Assessment:  · Malnutrition Status: No malnutrition    Nutrition Risk Level   Risk Level: Low    Nutrition Diagnosis:   · Problem: Altered nutrition-related lab values  · Etiology: Cardiac dysfunction    Signs and symptoms: Lab values    Nutrition Intervention:  Food and/or Delivery: Continue current diet  Nutrition Education/Counseling/Coordination of Care:  Continued Inpatient Monitoring, Education Initiated(Not able to evaluate ed needs.  Left CHF diet therapy information at bedside for reference. )      Electronically signed by Jessika Grubbs RD, [place] on 6/11/19 at 2:14 PM    Contact Number: [phone]

## 2019-06-11 NOTE — PROGRESS NOTES
Hospitalist Progress Note      PCP: ALLY Small MD    Date of Admission: 6/8/2019    Subjective: feels much better today, awaiting cardio eval    Medications:  Reviewed    Infusion Medications    dextrose       Scheduled Medications    [START ON 6/12/2019] metoprolol succinate  50 mg Oral Daily    sodium chloride flush  10 mL Intravenous 2 times per day    rivaroxaban  20 mg Oral Daily    insulin lispro  0-6 Units Subcutaneous TID WC    insulin lispro  0-3 Units Subcutaneous Nightly    simvastatin  20 mg Oral Nightly    metoprolol tartrate  25 mg Oral BID     PRN Meds: sodium chloride flush, magnesium hydroxide, ondansetron, glucose, dextrose, glucagon (rDNA), dextrose      Intake/Output Summary (Last 24 hours) at 6/11/2019 1635  Last data filed at 6/11/2019 1506  Gross per 24 hour   Intake 950 ml   Output 1975 ml   Net -1025 ml       Physical Exam Performed:    /71   Pulse 71   Temp 97.5 °F (36.4 °C) (Oral)   Resp 16   Ht 5' 4\" (1.626 m)   Wt 161 lb 9.6 oz (73.3 kg)   SpO2 95%   BMI 27.74 kg/m²       General appearance: comfortable, distress- none   HEENT: Atraumatic, Pupils equal, round, and reactive to light.  Extra ocular muscles intact.    Neck: Supple, with full range of motion. No jugular venous distention. Respiratory:  Crackles on  auscultation , accessory muscle use no, Rales/Ronchi n  Cardiovascular: Regular rate and rhythm with normal S1/S2 ,  Abdomen: Soft, non-tender, non-distended with normal bowel sounds. Musculoskelatal: No clubbing, 1+  edema bilaterally.     Dorsalis pedal pulses +   And capillary refills time is  <  than 3 secs. Skin: Skin color, texture, turgor normal.     Neurologic:   Neurovascularly intact grossly non-focal.    Labs:   No results for input(s): WBC, HGB, HCT, PLT in the last 72 hours.   Recent Labs     06/09/19  0418 06/10/19  0530 06/11/19  0434    140 139   K 3.8 4.4 3.5    97* 98*   CO2 26 29 29   BUN 17 26* 33*   CREATININE 1.0 0.8 1.5*   CALCIUM 10.0 10.9* 10.6     No results for input(s): AST, ALT, BILIDIR, BILITOT, ALKPHOS in the last 72 hours. No results for input(s): INR in the last 72 hours. Recent Labs     06/09/19  1030 06/09/19  1641 06/09/19  2207   TROPONINI <0.01 <0.01 <0.01       Urinalysis:      Lab Results   Component Value Date    NITRU Negative 06/08/2019    WBCUA 0 06/08/2019    RBCUA 9 06/08/2019    BLOODU MODERATE 06/08/2019    SPECGRAV 1.013 06/08/2019    GLUCOSEU Negative 06/08/2019       Radiology:  XR CHEST PORTABLE   Final Result   Low volume study. Mild pulmonary edema with small left pleural effusion. CT ABDOMEN PELVIS WO CONTRAST Additional Contrast? None   Final Result   Severe scrotal soft tissue swelling suspicious for cellulitis. No soft   tissue gas to suggest gangrene. Cardiomegaly and bilateral pleural effusions, possibly representing mild   pulmonary edema. Cholelithiasis. Assessment/Plan:    Active Hospital Problems    Diagnosis    Permanent atrial fibrillation ([facility] [address] [I48.2]    Coronary artery disease involving native coronary artery of native heart without angina pectoris [I25.10]    History of coronary artery bypass graft [Z95.1]    ALEJANDRA (acute kidney injury) ([address] [N17.9]    Presence of cardiac pacemaker [Z95.0]    Acute on chronic systolic heart failure (HCC) [I50.23]         1.  Acute systolic CHF, swelling in legs and scrotum - treated with IV Lasix. On BB, reviewed echo with EF 25%. Consulted cardio , no ACEi/ARB 2/2 renal failure  History of chronic atrial fibrillation on anticoagulation with Xarelto, status post permanent pacemaker,     2. ARF - creat increased from 0.8-->1.5, hold lasix     3.  Diabetes mellitus type 2 - monitor on SSI     4.  CAD, remote history of CABG, no active chest pain at this time, cardio consulted         Diet: DIET GENERAL; Low Sodium (2 GM);  Daily Fluid Restriction: 1500 ml  Code Status: Full Code    PT/OT Eval Status:

## 2019-06-11 NOTE — PROGRESS NOTES
Hospitalist Progress Note      PCP: ALLY Guthrie MD    Date of Admission: 6/8/2019    Subjective: feels scrotal edema improved    Medications:  Reviewed    Infusion Medications    dextrose       Scheduled Medications    furosemide  40 mg Intravenous BID    sodium chloride flush  10 mL Intravenous 2 times per day    rivaroxaban  20 mg Oral Daily    insulin lispro  0-6 Units Subcutaneous TID WC    insulin lispro  0-3 Units Subcutaneous Nightly    simvastatin  20 mg Oral Nightly    metoprolol tartrate  25 mg Oral BID     PRN Meds: sodium chloride flush, magnesium hydroxide, ondansetron, glucose, dextrose, glucagon (rDNA), dextrose      Intake/Output Summary (Last 24 hours) at 6/10/2019 2218  Last data filed at 6/10/2019 2015  Gross per 24 hour   Intake 920 ml   Output 2150 ml   Net -1230 ml       Physical Exam Performed:    BP (!) 82/50   Pulse 92   Temp 97.5 °F (36.4 °C) (Oral)   Resp 18   Ht 5' 4\" (1.626 m)   Wt 161 lb 9.6 oz (73.3 kg)   SpO2 96%   BMI 27.74 kg/m²       General appearance: comfortable, distress- none   HEENT: Atraumatic, Pupils equal, round, and reactive to light. Extra ocular muscles intact. Neck: Supple, with full range of motion. No jugular venous distention. Respiratory:  Crackles on  auscultation , accessory muscle use no, Rales/Ronchi n  Cardiovascular: Regular rate and rhythm with normal S1/S2 ,  Abdomen: Soft, non-tender, non-distended with normal bowel sounds. Musculoskelatal: No clubbing, 1+  edema bilaterally. Dorsalis pedal pulses +   And capillary refills time is  <  than 3 secs.   Skin: Skin color, texture, turgor normal.     Neurologic:   Neurovascularly intact grossly non-focal.       Labs:   Recent Labs     06/08/19  1335   WBC 3.5*   HGB 12.5*   HCT 38.5*   PLT 94*     Recent Labs     06/08/19  1335 06/09/19  0418 06/10/19  0530    140 140   K 4.5 3.8 4.4    101 97*   CO2 24 26 29   BUN 15 17 26*   CREATININE 0.9 1.0 0.8   CALCIUM 9.7 Subjective:  HPI                    Objective:  System    Physical Exam    General     ROS    Assessment/Plan:    SUBJECTIVE  Subjective changes as noted by pt:  Pt had a synvisc injection yesterday. He was told not to do too much with his shoulder. He is experiencing more pain in his L rib area (#8-9) and has some muscular guarding around the area as well.    Current pain level: 4/10   In the ribs  Changes in function:  Yes (See Goal flowsheet attached for changes in current functional level)     Adverse reaction to treatment or activity:  None    OBJECTIVE  Changes in objective findings:  Yes, Hypertonicity noted along UT/LS and rhomboids today.    Posterior subluxed ribs on L 8-9 ribs        ASSESSMENT  Abdullai continues to require intervention to meet STG and LTG's: PT  Patient's symptoms are resolving.  Patient is progressing as expected.  Response to therapy has shown an improvement in  pain level and ROM   Progress made towards STG/LTG?  Yes (See Goal flowsheet attached for updates on achievement of STG and LTG)    PLAN  Current treatment program is being advanced to more complex exercises.    PTA/ATC plan:  N/A    Please refer to the daily flowsheet for treatment today, total treatment time and time spent performing 1:1 timed codes.           10.0 10.9*     Recent Labs     06/08/19  1335   AST 24   ALT 17   BILITOT 1.2*   ALKPHOS 98     Recent Labs     06/08/19  1335   INR 2.06*     Recent Labs     06/09/19  1030 06/09/19  1641 06/09/19  2207   TROPONINI <0.01 <0.01 <0.01       Urinalysis:      Lab Results   Component Value Date    NITRU Negative 06/08/2019    WBCUA 0 06/08/2019    RBCUA 9 06/08/2019    BLOODU MODERATE 06/08/2019    SPECGRAV 1.013 06/08/2019    GLUCOSEU Negative 06/08/2019       Radiology:  XR CHEST PORTABLE   Final Result   Low volume study. Mild pulmonary edema with small left pleural effusion. CT ABDOMEN PELVIS WO CONTRAST Additional Contrast? None   Final Result   Severe scrotal soft tissue swelling suspicious for cellulitis. No soft   tissue gas to suggest gangrene. Cardiomegaly and bilateral pleural effusions, possibly representing mild   pulmonary edema. Cholelithiasis. Assessment/Plan:    Active Hospital Problems    Diagnosis    CHF (congestive heart failure), NYHA class I, acute on chronic, combined ([facility] [address] [I50.43]       1. Acute on chronic diastolic CHF, swelling in legs and scrotum - cont IV Lasix. On BB, check echo. Will consider cardio consult pending echo  History of chronic atrial fibrillation on anticoagulation with Xarelto, status post permanent pacemaker,   BNP is more than 3000 no evidence of cellulitis on examination    2. Diabetes mellitus type 2 on oral hypoglycemic agents, now on hold, monitors glucoses on sliding scale. 3.  CAD, remote history of CABG, no active chest pain at this time, will monitor troponins        Diet: DIET GENERAL; Low Sodium (2 GM);  Daily Fluid Restriction: 1500 ml  Code Status: Full Code    PT/OT Eval Status: ordered    Jackie Alonso MD

## 2019-06-11 NOTE — PLAN OF CARE
Problem: SAFETY  Goal: Free from accidental physical injury  Outcome: Ongoing     Problem: PAIN  Goal: Patient's pain/discomfort is manageable  Outcome: Ongoing     Problem: Falls - Risk of:  Goal: Will remain free from falls  Description  Will remain free from falls  Outcome: Ongoing     Problem: OXYGENATION/RESPIRATORY FUNCTION  Goal: Patient will maintain patent airway  Outcome: Ongoing  Note:   Educated patient/family on CHF signs/ symptoms, causes, treatments, limited fluid intake, daily weights, low sodium diet, and follow-up. Pt to call attending physician if weight gain of 3 pounds in one day or 5 pounds in one week.

## 2019-06-11 NOTE — PLAN OF CARE
Problem: SAFETY  Goal: Free from accidental physical injury  6/11/2019 1059 by Damon Garcia RN  Outcome: Ongoing  Note:   Patient assessed for fall risk; fall precautions initiated. Patient and family instructed about safety devices. Environment kept free of clutter and adequate lighting provided. Bed locked and in lowest position. Call light within reach. Will continue to monitor. Problem: PAIN  Goal: Patient's pain/discomfort is manageable  6/11/2019 1059 by Daomn Garcia RN  Outcome: Ongoing  Note:   Pain/discomfort being managed with PRN analgesics per MD orders. Pt able to express presence and absence of pain and rate pain appropriately using numerical scale.       Problem: OXYGENATION/RESPIRATORY FUNCTION  Goal: Patient will maintain patent airway  6/11/2019 1059 by Damon Garcia RN  Outcome: Ongoing  Note:         Problem: HEMODYNAMIC STATUS  Goal: Patient has stable vital signs and fluid balance  Outcome: Ongoing  Note:

## 2019-06-11 NOTE — CONSULTS
with supper   Yes Historical Provider, MD   glimepiride (AMARYL) 2 MG tablet Take 2 mg by mouth every morning (before breakfast)   Yes Historical Provider, MD   simvastatin (ZOCOR) 40 MG tablet Take 40 mg by mouth nightly   Yes Historical Provider, MD   metoprolol tartrate (LOPRESSOR) 25 MG tablet Take 25 mg by mouth 2 times daily   Yes Historical Provider, MD        CURRENT Medications:    sodium chloride flush 0.9 % injection 10 mL 2 times per day   sodium chloride flush 0.9 % injection 10 mL PRN   magnesium hydroxide (MILK OF MAGNESIA) 400 MG/5ML suspension 30 mL Daily PRN   ondansetron (ZOFRAN) injection 4 mg Q6H PRN   rivaroxaban (XARELTO) tablet 20 mg Daily   glucose (GLUTOSE) 40 % oral gel 15 g PRN   dextrose 50 % IV solution PRN   glucagon (rDNA) injection 1 mg PRN   dextrose 5 % solution PRN   insulin lispro (HUMALOG) injection vial 0-6 Units TID WC   insulin lispro (HUMALOG) injection vial 0-3 Units Nightly   simvastatin (ZOCOR) tablet 20 mg Nightly   metoprolol tartrate (LOPRESSOR) tablet 25 mg BID       Allergies:  Patient has no known allergies. Review of Systems:   · Constitutional: no unanticipated weight loss. There's been no change in energy level, sleep pattern, or activity level. No fevers, chills. · Eyes: No visual changes or diplopia. No scleral icterus. · ENT: No Headaches, hearing loss or vertigo. No mouth sores or sore throat. · Cardiovascular: as reviewed in HPI  · Respiratory: No cough or wheezing, no sputum production. No hematemesis. · Gastrointestinal: No abdominal pain, appetite loss, blood in stools. No change in bowel or bladder habits. · Genitourinary: No dysuria, trouble voiding, or hematuria. · Musculoskeletal:  No gait disturbance, no joint complaints. · Integumentary: No rash or pruritis. · Neurological: No headache, diplopia, change in muscle strength, numbness or tingling. · Psychiatric: No anxiety or depression. · Endocrine: No temperature intolerance.  No 2019    BILITOT 1.2 2019    ALKPHOS 98 2019    AST 24 2019    ALT 17 2019     PT/INR:  No results found for: PTINR  HgBA1c:  Lab Results   Component Value Date    LABA1C 7.9 2019     Lab Results   Component Value Date    TROPONINI <0.01 2019       Cardiac Data     EK19  Electronic ventricular pacemaker with underlying atrial fibrillation. Echo: 6/10/19  Definity contrast administered. Left ventricular cavity size is mildly dilated. There is mild concentric left ventricular hypertrophy. There is akinesis of the mid to apical septal,anterior & inferoapical walls. Ejection fraction is visually estimated to be 25-30%. Mild mitral regurgitation is present. The left atrium is severely dilated. The aortic valve appears mildly thickend. Mild aortic regurgitation. Mildly dilated right ventricle. Right ventricular systolic function is mildly reduced. Pacer / ICD wire is visualized in the right ventricle. Mild to moderate tricuspid regurgitation. The right atrium is moderately dilated. IVC size is dilated (>2.1 cm) but collapses > 50% with respiration consistent with elevated RA pressure (8 mmHg). Estimated pulmonary artery systolic pressure is elevated at 48 mmHg assuming a right atrial pressure of 8 mmHg c/w mild to moderate pulmonay HTN. Echo 3/2016 with normal LVEF    Tele: V-paced     Assessment and Plan   1) Acute on chronic systolic heart failure. New diagnosis but last echo 3 years ago. Etiology uncertain but could be related to CAD. Chronic RV pacing also possibility. Subjectively improved but may be over-diuresed. Lasix discontinued with ALEJANDRA. Will change metoprolol to Toprol XL. No ACE-I/ARB/aldactone with ALEJANDRA but should be readdressed as outpatient. Will defer ischemic work-up (stress or angiography) to primary cardiologist as an outpatient. 2) Permanent atrial fibrillation. Continue DOAC. 3) AV block s/p PPM (Biotronic).  Pacemaker dependent. No acute intervention. 4) CAD s/p CABG. Asymptomatic. Continue with medical management and risk factor modification including aspirin, statin, and B-blocker. 5) Pancytopenia. Will defer to primary team if additional testing needed. 6) ALEJANDRA. Likely related to diuretics. Lasix held. Continue to monitor. Thank you for allowing us to participate in the care of June Brown. Francisca Salmeron.  Alessandro [facility], [place], [address] [place] [address] Road  6/11/2019 4:10 PM

## 2019-06-12 VITALS
DIASTOLIC BLOOD PRESSURE: 71 MMHG | BODY MASS INDEX: 27.21 KG/M2 | WEIGHT: 159.39 LBS | RESPIRATION RATE: 18 BRPM | SYSTOLIC BLOOD PRESSURE: 106 MMHG | HEIGHT: 64 IN | OXYGEN SATURATION: 93 % | HEART RATE: 68 BPM | TEMPERATURE: 97.9 F

## 2019-06-12 LAB
ANION GAP SERPL CALCULATED.3IONS-SCNC: 11 MMOL/L (ref 3–16)
BUN BLDV-MCNC: 28 MG/DL (ref 7–20)
CALCIUM SERPL-MCNC: 9.5 MG/DL (ref 8.3–10.6)
CHLORIDE BLD-SCNC: 104 MMOL/L (ref 99–110)
CO2: 26 MMOL/L (ref 21–32)
CREAT SERPL-MCNC: 0.9 MG/DL (ref 0.8–1.3)
GFR AFRICAN AMERICAN: >60
GFR NON-AFRICAN AMERICAN: >60
GLUCOSE BLD-MCNC: 107 MG/DL (ref 70–99)
GLUCOSE BLD-MCNC: 118 MG/DL (ref 70–99)
GLUCOSE BLD-MCNC: 167 MG/DL (ref 70–99)
HCT VFR BLD CALC: 40.5 % (ref 40.5–52.5)
HEMOGLOBIN: 13.3 G/DL (ref 13.5–17.5)
MAGNESIUM: 2 MG/DL (ref 1.8–2.4)
MCH RBC QN AUTO: 29.1 PG (ref 26–34)
MCHC RBC AUTO-ENTMCNC: 32.8 G/DL (ref 31–36)
MCV RBC AUTO: 88.6 FL (ref 80–100)
PDW BLD-RTO: 15.4 % (ref 12.4–15.4)
PERFORMED ON: ABNORMAL
PERFORMED ON: ABNORMAL
PLATELET # BLD: 108 K/UL (ref 135–450)
PMV BLD AUTO: 7.6 FL (ref 5–10.5)
POTASSIUM SERPL-SCNC: 4 MMOL/L (ref 3.5–5.1)
RBC # BLD: 4.57 M/UL (ref 4.2–5.9)
SODIUM BLD-SCNC: 141 MMOL/L (ref 136–145)
WBC # BLD: 4.4 K/UL (ref 4–11)

## 2019-06-12 PROCEDURE — 6370000000 HC RX 637 (ALT 250 FOR IP): Performed by: INTERNAL MEDICINE

## 2019-06-12 PROCEDURE — 83735 ASSAY OF MAGNESIUM: CPT

## 2019-06-12 PROCEDURE — 36415 COLL VENOUS BLD VENIPUNCTURE: CPT

## 2019-06-12 PROCEDURE — 2580000003 HC RX 258: Performed by: INTERNAL MEDICINE

## 2019-06-12 PROCEDURE — 94760 N-INVAS EAR/PLS OXIMETRY 1: CPT

## 2019-06-12 PROCEDURE — 80048 BASIC METABOLIC PNL TOTAL CA: CPT

## 2019-06-12 PROCEDURE — 85027 COMPLETE CBC AUTOMATED: CPT

## 2019-06-12 PROCEDURE — 99232 SBSQ HOSP IP/OBS MODERATE 35: CPT | Performed by: INTERNAL MEDICINE

## 2019-06-12 RX ORDER — FUROSEMIDE 40 MG/1
40 TABLET ORAL DAILY
Status: DISCONTINUED | OUTPATIENT
Start: 2019-06-13 | End: 2019-06-12 | Stop reason: HOSPADM

## 2019-06-12 RX ORDER — LISINOPRIL 5 MG/1
5 TABLET ORAL DAILY
Status: DISCONTINUED | OUTPATIENT
Start: 2019-06-13 | End: 2019-06-12 | Stop reason: HOSPADM

## 2019-06-12 RX ORDER — METOPROLOL SUCCINATE 50 MG/1
50 TABLET, EXTENDED RELEASE ORAL DAILY
Qty: 30 TABLET | Refills: 3 | Status: SHIPPED | OUTPATIENT
Start: 2019-06-13

## 2019-06-12 RX ORDER — ASPIRIN 81 MG/1
81 TABLET, CHEWABLE ORAL DAILY
Qty: 30 TABLET | Refills: 3 | Status: SHIPPED | OUTPATIENT
Start: 2019-06-13

## 2019-06-12 RX ORDER — LISINOPRIL 5 MG/1
5 TABLET ORAL DAILY
Qty: 30 TABLET | Refills: 3 | Status: SHIPPED | OUTPATIENT
Start: 2019-06-13

## 2019-06-12 RX ORDER — FUROSEMIDE 40 MG/1
40 TABLET ORAL DAILY
Qty: 60 TABLET | Refills: 3 | Status: SHIPPED | OUTPATIENT
Start: 2019-06-13

## 2019-06-12 RX ORDER — ASPIRIN 81 MG/1
81 TABLET, CHEWABLE ORAL DAILY
Status: DISCONTINUED | OUTPATIENT
Start: 2019-06-13 | End: 2019-06-12 | Stop reason: HOSPADM

## 2019-06-12 RX ADMIN — Medication 10 ML: at 08:39

## 2019-06-12 RX ADMIN — INSULIN LISPRO 1 UNITS: 100 INJECTION, SOLUTION INTRAVENOUS; SUBCUTANEOUS at 12:11

## 2019-06-12 RX ADMIN — METOPROLOL SUCCINATE 50 MG: 50 TABLET, EXTENDED RELEASE ORAL at 08:39

## 2019-06-12 RX ADMIN — Medication 10 ML: at 08:40

## 2019-06-12 ASSESSMENT — PAIN SCALES - GENERAL
PAINLEVEL_OUTOF10: 0

## 2019-06-12 NOTE — PROGRESS NOTES
Iv and tele d/cd. Discharge instructions given to pt and son, verbalized understanding. meds filled per retail pharmacy. Belongings gathered per pt.  Pt taken out via wheelchair and son to drive pt home  Electronically signed by Mary Hancock RN on 6/12/2019 at 5:21 PM

## 2019-06-12 NOTE — PLAN OF CARE
Problem: SAFETY  Goal: Free from accidental physical injury  6/12/2019 1112 by Parker Garrison RN  Outcome: Ongoing  Note:   Patient assessed for fall risk; fall precautions initiated. Patient  instructed about safety devices. Environment kept free of clutter and adequate lighting provided. Bed locked and in lowest position. Call light within reach. Will continue to monitor. Problem: SKIN INTEGRITY  Goal: Skin integrity is maintained or improved  Outcome: Ongoing  Note:   Skin assessment completed every shift. Pt assessed for incontinence, appropriate barrier cream applied prn. Pt encouraged to turn/rotate every 2 hours. Assistance provided if pt unable to do so themselves.         Problem: OXYGENATION/RESPIRATORY FUNCTION  Goal: Patient will maintain patent airway  Outcome: Ongoing  Note:          Problem: HEMODYNAMIC STATUS  Goal: Patient has stable vital signs and fluid balance  6/12/2019 1112 by Parker Garrison RN  Outcome: Ongoing  Note:

## 2019-06-12 NOTE — CONSULTS
week.  [x]  Received verbal acknowledgment/understanding of Heart Failure related causes. [x]  Provided instructions on how to maintain a low sodium diet. []  Provided recommendations for smoking cessation programs  [x]  Provided recommendations on activity and exercise    [x]  Other: HF RN received from Dr Juni Lucio as part of HF order set. Chart reviewed. Patient presented to ED with c/o swollen scrotum w/ tenderness x 1 week. He exhibited LE edema extending to abdominal wall. Patient follows with [facility] and was last seen in office on 5/24/18 by Dr Patricia Bahena. There was no mention of HF at that time and pt on no home diuretic. Weight was 170# at that time. ProBNP on admission was 3336 with Cr 0.9. CXR showed mild pulm edema and small left pleural effusion. Weight 179# (ED) and 174# (floor). Patient was treated with IV bolus Lasix. Cardiology was consulted. Patient did not keep output on all voids so I/O inaccurate. Weight 159# today on standing scale (-15#). I met with patient in his room. He is elderly  male very welcoming of \"knowledge\". He confirms events leading to admission. He shares he was trying to wait until his regular scheduled appt (6/13) \"but it got so bad I couldn't put my socks on\". We discussed general definition of systolic HF and his echo findings. Patient informed of HF having no cure and importance of daily ongoing self assessment / management to best treat this disease. He voices desire to continue cardiac care with Dr Patricia Bahena. Contact info for [facility] WPS Resources provided. Pt reports he has a scale at home and weighs daily. He does log it. Praise given. I provided weight log (but encouraged him to use whatever system works for him) and corresponding AHA HF zones sheet. We discussed the 3/5 rule and s/sx of worsening HF.   He points to the \"trouble sleeping\" and states he was \"waking up in a panic like something was wrong -- I could barely catch my breath\". He denies any LE edema but reports scrotal edema \"so bad I couldn't wear my pants\". He reports a usual weight of 170# but we discussed his weight today is 159#. We reviewed concept of DRY WEIGHT and I instructed him to weigh himself on his own home scale to determine his baseline. He voices understanding. Patient reports he lives with his son. He and son do grocery shopping together and son does most of cooking. He states they frequently eat ham, rice, noodles, hamburgers, and use soy sauce \"on everything\". He admits to using salt shaker. We reviewed the rationale of sodium and fluid restrictions and the recommendations of each. We reviewed the 3 week \"wean\" from added salt and how to read a food label. He voices understanding. We reviewed his home medication list.  We discussed EB medications and slight change in his metoprolol (to succinate) for EF 25%. We also discussed importance of diuretic. We discussed expectation of being instructed to take extra lasix if he calls office with concerns of yellow zone. We discussed BID timing. We also reviewed s/sx of DEhydration and importance of reporting these concerns as well. He voices understanding. We discussed potential benefit of OP programs. I placed a CR referral already but they have not seen patient yet. We discussed [address] and he is agreeable for referral to be sent. As I was wrapping up, pt's son called the room and pt asked I speak with him. I introduced self and my role in his dad's care. I informed son there was written info for everything I had reviewed with pt. I urged him to call with any questions or if he would like to the review the info by phone with one of the HF RNs. He voices his appreciation. He asked for update on his dad's renal function and weight -- done. We discussed DRY WT and importance of dietary modification.   Son reports his father is being modest and that HE (pt) does bulk of cooking. I urged son to be involved to ensure necessary changes were made. He assures me he will. Overall, pt was very engaged. He made excellent analogies -- telling me his \"car engine was old\". He laughingly states \"you fix one part and another breaks\". I volleyed back \"but your car had been making a funny noise for several days and you weren't doing anything about it\". He states \"I know ! It was smoking ! \". He then seriously acknowledges importance of HF management. He is agreeable for CR and WC referrals. CURRENT DIET: DIET GENERAL; Low Sodium (2 GM); Daily Fluid Restriction: 1500 ml    EDUCATIONAL PACKETS PROVIDED- PRINTED FROM [employer]. Titles and material given:  Yes   [x]  What is Heart Failure? [x]  Heart Failure: Warning Signs of a Flare-Up  [x]  Heart Failure: Making Changes to Your Diet  [x]  Heart Failure: Medications to Help Your Heart   [x]  Other: weight log, AHA HF zones sheet, [employer], Sodium Content in Foods, Fast food nutrition Guide     PATIENT/CAREGIVER TEACHING:    Level of patient/caregiver understanding able to:   [x] Verbalize understanding   [] Demonstrate understanding       [x] Teach back        [x] Needs reinforcement     []  Other:      TEACHING TIME:  60 minutes       Plan:       DISCHARGE PLAN:  Placement for patient upon discharge: home with support   Hospice Care:  no  Code Status: Full Code  Discharge appointment scheduled: Yes     RECOMMENDATIONS:   [x]  Encourage to call Heart Failure Resource Line with any questions or concerns. [x]  Educate further Mr. Brenna Posada on fluid restriction 48 oz- 64 oz during inpatient stay so he can understand how to measure intake at home. [x]  Continue to educate on S/S of Heart Failure.   [x]  Emphasize daily weights, diet, and if changes, to call Heart Failure Resource Line  [x]  Other:  Cardiac Rehab referral : placed / brochure provided   [facility] referral : accepted / brochure provided

## 2019-06-12 NOTE — PLAN OF CARE
Problem: SAFETY  Goal: Free from accidental physical injury  6/12/2019 0031 by Shad Berrios RN  Outcome: Ongoing  6/11/2019 1059 by Shira Chandler RN  Outcome: Ongoing  Note:   Patient assessed for fall risk; fall precautions initiated. Patient and family instructed about safety devices. Environment kept free of clutter and adequate lighting provided. Bed locked and in lowest position. Call light within reach. Will continue to monitor. Problem: PAIN  Goal: Patient's pain/discomfort is manageable  6/12/2019 0031 by Shad Berrios RN  Outcome: Ongoing  6/11/2019 1059 by Shira Chandler RN  Outcome: Ongoing  Note:   Pain/discomfort being managed with PRN analgesics per MD orders. Pt able to express presence and absence of pain and rate pain appropriately using numerical scale. Problem: Falls - Risk of:  Goal: Will remain free from falls  Description  Will remain free from falls  Outcome: Ongoing     Problem: OXYGENATION/RESPIRATORY FUNCTION  Goal: Patient will maintain patent airway  6/11/2019 1059 by Shira Chandler RN  Outcome: Ongoing  Note:         Problem: HEMODYNAMIC STATUS  Goal: Patient has stable vital signs and fluid balance  6/12/2019 0031 by Shad Berrios RN  Outcome: Ongoing  Note:   Educated patient/family on CHF signs/ symptoms, causes, treatments, limited fluid intake, daily weights, low sodium diet, and follow-up. Pt to call attending physician if weight gain of 3 pounds in one day or 5 pounds in one week.    6/11/2019 1059 by Shira Chandler RN  Outcome: Ongoing  Note:

## 2019-06-12 NOTE — PROGRESS NOTES
Derrick   Daily Progress Note      Admit Date:  6/8/2019    Reason for initial consultation: CHF    CC: \"I feel back to normal\"    Subjective:  Pt with no acute overnight cardiac events. Denies chest pain, palpitations, and dyspnea. Feels comfortable being discharged home today. Review of Systems:   · Constitutional: no unanticipated weight loss. There's been no change in energy level, sleep pattern, or activity level. No fevers, chills. · Eyes: No visual changes or diplopia. No scleral icterus. · ENT: No Headaches, hearing loss or vertigo. No mouth sores or sore throat. · Cardiovascular: as reviewed in HPI  · Respiratory: No cough or wheezing, no sputum production. No hematemesis. · Gastrointestinal: No abdominal pain, appetite loss, blood in stools. No change in bowel or bladder habits. · Genitourinary: No dysuria, trouble voiding, or hematuria. · Musculoskeletal:  No gait disturbance, no joint complaints. · Integumentary: No rash or pruritis. · Neurological: No headache, diplopia, change in muscle strength, numbness or tingling. · Psychiatric: No anxiety or depression. · Endocrine: No temperature intolerance. No excessive thirst, fluid intake, or urination. No tremor. · Hematologic/Lymphatic: No abnormal bruising or bleeding, blood clots or swollen lymph nodes. · Allergic/Immunologic: No nasal congestion or hives. Objective:   /71   Pulse 68   Temp 97.9 °F (36.6 °C) (Oral)   Resp 18   Ht 5' 4\" (1.626 m)   Wt 159 lb 6.3 oz (72.3 kg)   SpO2 93%   BMI 27.36 kg/m²       Intake/Output Summary (Last 24 hours) at 6/12/2019 1440  Last data filed at 6/12/2019 1208  Gross per 24 hour   Intake 720 ml   Output 1550 ml   Net -830 ml     Wt Readings from Last 3 Encounters:   06/12/19 159 lb 6.3 oz (72.3 kg)       Physical Exam:  General:  NAD. Awake, alert, and oriented X4. Skin:  Warm and dry. No new appearing rashes or lesions. Neck:  Supple.  No JVP or bruit

## 2019-06-12 NOTE — PROGRESS NOTES
Pharmacy Heart Failure Medication Reconciliation Note    Pt discharged from [facility] today after admission for heart failure. Heriberto Rodriguez has a diagnosis of systolic heart failure (last EF = 25-30% on 6/8/19).     Patient taking an ACEI / ARB / Entresto:  Yes     Patient taking a BETA BLOCKER:  Yes  Patient taking a LOOP DIURETIC: No: held due to ALEJNADRA   Patient taking a ALDOSTERONE RECEPTOR ANTAGONIST: No: due to increased serum creatinine during admission     Corrections to discharge medications include:  none    Discharge Medications:         Medication List        START taking these medications      metoprolol succinate 50 MG extended release tablet  Commonly known as:  TOPROL XL  Take 1 tablet by mouth daily  Start taking on:  6/13/2019            CONTINUE taking these medications      glimepiride 2 MG tablet  Commonly known as:  AMARYL     lisinopril 30 MG tablet  Commonly known as:  PRINIVIL;ZESTRIL     rivaroxaban 20 MG Tabs tablet  Commonly known as:  XARELTO     simvastatin 40 MG tablet  Commonly known as:  ZOCOR     therapeutic multivitamin-minerals tablet     vitamin C 500 MG tablet  Commonly known as:  ASCORBIC ACID            STOP taking these medications      metoprolol tartrate 25 MG tablet  Commonly known as:  LOPRESSOR               Where to Get Your Medications        These medications were sent to [address] [place] [address] [address], [address] [facility] [phone]  [address] [address], [address] [address]      Phone:  [phone]   metoprolol succinate 50 MG extended release tablet         Faye Ruelas PharmD  Heart Failure Discharge Medication Reconciliation Program  [phone]

## 2019-06-13 NOTE — DISCHARGE SUMMARY
Hospital Medicine Discharge Summary    Patient ID: Clay Bustillo      Patient's PCP: ALLY Baum MD    Admit Date: 6/8/2019     Discharge Date: 6/12/2019      Admitting Physician: Romina Nixon MD     Discharge Physician: Carmelo Woods MD     Discharge Diagnoses: Active Hospital Problems    Diagnosis    [facility] [I48.2]    Coronary artery disease involving native coronary artery of native heart without angina pectoris [I25.10]    History of coronary artery bypass graft [Z95.1]    ALEJANDRA (acute kidney injury) ([facility] [N17.9]    Presence of cardiac pacemaker [Z95.0]    Acute on chronic systolic heart failure ([facility] [I50.23]       The patient was seen and examined on day of discharge and this discharge summary is in conjunction with any daily progress note from day of discharge. Hospital Course:   [de-identified] y.o. male who presented to [facility] ed  with above complaints. Symptom onset has been acute for a time period of 1 week(s). Severity is described as mild-moderate. Course of his symptoms over time is constant. Associated with swellign and wt gain  Not Associated with fever, chills. No h/o of recent travel, ill contacts,weight loss. Pain description -  No pain      1.  Acute systolic CHF, swelling in legs and scrotum - treated with IV Lasix. On BB, reviewed echo with EF 25%. Consulted cardio , held ACEi/ARB 2/2 renal failure, restarted on d/c, d/c on PO lasix  History of chronic atrial fibrillation on anticoagulation with Xarelto, status post permanent pacemaker,      2.  ARF - creat increased from 0.8-->1.5, held lasix, resolved     3.  Diabetes mellitus type 2 - monitor on SSI, controlled     4.  CAD, remote history of CABG, no active chest pain at this time, cardio consulted       Physical Exam Performed:     /71   Pulse 68   Temp 97.9 °F (36.6 °C) (Oral)   Resp 18   Ht 5' 4\" (1.626 m)   Wt 159 lb 6.3 oz (72.3 kg)   SpO2 93%   BMI 27.36 kg/m² General appearance: comfortable, distress- none   HEENT: Atraumatic, Pupils equal, round, and reactive to light.  Extra ocular muscles intact.    Neck: Supple, with full range of motion. No jugular venous distention. Respiratory:  Crackles on  auscultation , accessory muscle use no, Rales/Ronchi n  Cardiovascular: Regular rate and rhythm with normal S1/S2 ,  Abdomen: Soft, non-tender, non-distended with normal bowel sounds. Musculoskelatal: No clubbing, 1+  edema bilaterally.     Dorsalis pedal pulses +   And capillary refills time is  <  than 3 secs. Skin: Skin color, texture, turgor normal.     Neurologic:   Neurovascularly intact grossly non-focal    Labs: For convenience and continuity at follow-up the following most recent labs are provided:      CBC:    Lab Results   Component Value Date    WBC 4.4 06/12/2019    HGB 13.3 06/12/2019    HCT 40.5 06/12/2019     06/12/2019       Renal:    Lab Results   Component Value Date     06/12/2019    K 4.0 06/12/2019    K 4.5 06/08/2019     06/12/2019    CO2 26 06/12/2019    BUN 28 06/12/2019    CREATININE 0.9 06/12/2019    CALCIUM 9.5 06/12/2019         Significant Diagnostic Studies    Radiology:   XR CHEST PORTABLE   Final Result   Low volume study. Mild pulmonary edema with small left pleural effusion. CT ABDOMEN PELVIS WO CONTRAST Additional Contrast? None   Final Result   Severe scrotal soft tissue swelling suspicious for cellulitis. No soft   tissue gas to suggest gangrene. Cardiomegaly and bilateral pleural effusions, possibly representing mild   pulmonary edema. Cholelithiasis.                 Consults:     IP CONSULT TO HEART FAILURE NURSE/COORDINATOR  IP CONSULT TO DIETITIAN  IP CONSULT TO CARDIAC REHAB  IP CONSULT TO CARDIOLOGY    Disposition:  home     Condition at Discharge: Stable    Discharge Instructions/Follow-up:  PCP in 1 week    Code Status:  Prior     Activity: activity as tolerated    Diet: diabetic diet      Discharge Medications:     Discharge Medication List as of 6/12/2019  4:14 PM           Details   metoprolol succinate (TOPROL XL) 50 MG extended release tablet Take 1 tablet by mouth daily, Disp-30 tablet, R-3Normal      furosemide (LASIX) 40 MG tablet Take 1 tablet by mouth daily, Disp-60 tablet, R-3Normal      aspirin 81 MG chewable tablet Take 1 tablet by mouth daily, Disp-30 tablet, R-3Normal              Details   lisinopril (PRINIVIL;ZESTRIL) 5 MG tablet Take 1 tablet by mouth daily, Disp-30 tablet, R-3Normal              Details   vitamin C (ASCORBIC ACID) 500 MG tablet Take 500 mg by mouth dailyHistorical Med      Multiple Vitamins-Minerals (THERAPEUTIC MULTIVITAMIN-MINERALS) tablet Take 1 tablet by mouth dailyHistorical Med      rivaroxaban (XARELTO) 20 MG TABS tablet Take 20 mg by mouth Daily with supperHistorical Med      glimepiride (AMARYL) 2 MG tablet Take 2 mg by mouth every morning (before breakfast)Historical Med      simvastatin (ZOCOR) 40 MG tablet Take 40 mg by mouth nightlyHistorical Med             Time Spent on discharge is more than 30 minutes in the examination, evaluation, counseling and review of medications and discharge plan. Signed:    Patricia Nuñez MD   6/13/2019      Thank you ALLY Barber MD for the opportunity to be involved in this patient's care. If you have any questions or concerns please feel free to contact me at [phone].

## 2019-06-14 ENCOUNTER — TELEPHONE (OUTPATIENT)
Dept: PHARMACY | Age: 79
End: 2019-06-14

## 2019-06-14 NOTE — TELEPHONE ENCOUNTER
[facility]  Heart Failure Service  [phone]        Follow up phone call:       I called and left a message for patient to call back to check status and schedule an appointment. I will try to reach patient again on 6/17/19.       Appropriate staff has been notified with regards to any concerns noted above   [facility]  Heart Failure Service  [phone]

## 2019-06-17 ENCOUNTER — SCHEDULED TELEPHONE ENCOUNTER (OUTPATIENT)
Dept: PHARMACY | Age: 79
End: 2019-06-17

## 2019-06-17 NOTE — TELEPHONE ENCOUNTER
[address] [facility]  Heart Failure Service  [phone]        Follow up phone call:     I tried to reach patient again today after hospital discharge on 6/12. I left a voice message to please call me back to schedule an appointment.       PLAN:   I will try to reach patient again tomorrow.     Appropriate staff has been notified with regards to any concerns noted above   [facility] [address] Service  [phone]    50 feet

## 2019-06-18 ENCOUNTER — SCHEDULED TELEPHONE ENCOUNTER (OUTPATIENT)
Dept: PHARMACY | Age: 79
End: 2019-06-18

## 2019-06-18 NOTE — TELEPHONE ENCOUNTER
[facility]  Heart Failure Service  [phone]        Follow up phone call:     3 rd attempt to reach patient since hospital discharge patient's son did leave a voice message returning our call. Patient follows with [facility].       PLAN:   Patient and son have our return number if interested in scheduling an appointment.     Appropriate staff has been notified with regards to any concerns noted above   [address] [facility]  Heart Failure Service  [phone]